# Patient Record
Sex: FEMALE | Race: WHITE | NOT HISPANIC OR LATINO
[De-identification: names, ages, dates, MRNs, and addresses within clinical notes are randomized per-mention and may not be internally consistent; named-entity substitution may affect disease eponyms.]

---

## 2017-11-01 ENCOUNTER — APPOINTMENT (OUTPATIENT)
Dept: ALLERGY | Facility: CLINIC | Age: 57
End: 2017-11-01
Payer: COMMERCIAL

## 2017-11-01 VITALS
HEIGHT: 65 IN | HEART RATE: 72 BPM | RESPIRATION RATE: 14 BRPM | SYSTOLIC BLOOD PRESSURE: 140 MMHG | WEIGHT: 184 LBS | BODY MASS INDEX: 30.66 KG/M2 | DIASTOLIC BLOOD PRESSURE: 88 MMHG

## 2017-11-01 PROCEDURE — G0008: CPT

## 2017-11-01 PROCEDURE — 94060 EVALUATION OF WHEEZING: CPT

## 2017-11-01 PROCEDURE — 99214 OFFICE O/P EST MOD 30 MIN: CPT | Mod: 25

## 2017-11-01 PROCEDURE — 90686 IIV4 VACC NO PRSV 0.5 ML IM: CPT

## 2017-11-01 RX ORDER — ALBUTEROL SULFATE 90 UG/1
108 (90 BASE) AEROSOL, METERED RESPIRATORY (INHALATION) EVERY 6 HOURS
Qty: 3 | Refills: 0 | Status: ACTIVE | COMMUNITY
Start: 2017-11-01 | End: 1900-01-01

## 2017-11-01 RX ORDER — FLUTICASONE PROPIONATE AND SALMETEROL 50; 250 UG/1; UG/1
250-50 POWDER RESPIRATORY (INHALATION)
Qty: 3 | Refills: 1 | Status: ACTIVE | COMMUNITY
Start: 2017-11-01 | End: 1900-01-01

## 2017-11-01 RX ORDER — PREDNISONE 10 MG/1
10 TABLET ORAL
Qty: 27 | Refills: 0 | Status: ACTIVE | COMMUNITY
Start: 2017-11-01 | End: 1900-01-01

## 2018-11-01 RX ORDER — PREDNISONE 10 MG/1
10 TABLET ORAL
Qty: 15 | Refills: 0 | Status: ACTIVE | COMMUNITY
Start: 2018-11-01 | End: 1900-01-01

## 2021-12-01 ENCOUNTER — APPOINTMENT (OUTPATIENT)
Dept: UROGYNECOLOGY | Facility: CLINIC | Age: 61
End: 2021-12-01
Payer: COMMERCIAL

## 2021-12-01 VITALS
WEIGHT: 199 LBS | HEART RATE: 71 BPM | SYSTOLIC BLOOD PRESSURE: 162 MMHG | DIASTOLIC BLOOD PRESSURE: 92 MMHG | HEIGHT: 65 IN | BODY MASS INDEX: 33.15 KG/M2 | TEMPERATURE: 97.3 F

## 2021-12-01 LAB
BILIRUB UR QL STRIP: NORMAL
CLARITY UR: CLEAR
COLLECTION METHOD: NORMAL
GLUCOSE UR-MCNC: NORMAL
HCG UR QL: 0.2 EU/DL
HGB UR QL STRIP.AUTO: NORMAL
KETONES UR-MCNC: NORMAL
LEUKOCYTE ESTERASE UR QL STRIP: NORMAL
NITRITE UR QL STRIP: NORMAL
PH UR STRIP: 5
PROT UR STRIP-MCNC: NORMAL
SP GR UR STRIP: 1.02

## 2021-12-01 PROCEDURE — 99204 OFFICE O/P NEW MOD 45 MIN: CPT | Mod: 25

## 2021-12-01 PROCEDURE — 51701 INSERT BLADDER CATHETER: CPT

## 2021-12-01 PROCEDURE — 81003 URINALYSIS AUTO W/O SCOPE: CPT | Mod: QW

## 2021-12-01 NOTE — PHYSICAL EXAM
[Chaperone Present] : A chaperone was present in the examining room during all aspects of the physical examination [No Acute Distress] : in no acute distress [Well developed] : well developed [Well Nourished] : ~L well nourished [Oriented x3] : oriented to person, place, and time [Respirations regular] : ~T respiratory rate was regular [No Edema] : ~T edema was not present [Warm and Dry] : was warm and dry to touch [Normal Gait] : gait was normal [Normal] : normal external genitalia [Vulvar Atrophy] : vulvar atrophy [Normal Appearance] : general appearance was normal [Atrophy] : atrophy [Rectocele] : a rectocele [Cystocele] : a cystocele [Aa ____] : Aa [unfilled] [Ba ____] : Ba [unfilled] [C ____] : C [unfilled] [GH ____] : GH [unfilled] [PB ____] : PB [unfilled] [TVL ____] : TVL  [unfilled] [Ap ____] : Ap [unfilled] [Bp ____] : Bp [unfilled] [Tenderness] : ~T no ~M abdominal tenderness observed [Distended] : not distended

## 2021-12-01 NOTE — DISCUSSION/SUMMARY
[FreeTextEntry1] : I reviewed the above findings with the patient.  We discussed the prolapse as well as her stress urinary incontinence.  Treatment options for the stress incontinence were discussed and included doing nothing, behavioral modification, Kegel's exercises with and without PT, medications, a pessary or intravaginal devices, periurethral bulking, and surgical correction with a suburethral sling v Rock v autologous sling. Treatment options for the prolapse were discussed and included doing nothing, Kegel exercises and behavioral modification, a pessary, or surgical correction.Surgically we discussed an anterior and/or posterior repair. \par We also discussed weight loss as a treatment option.\par She reports that she has tried Kegel's in the past without success and is interested in surgical correction.  I have asked her to return for urodynamic testing to further evaluate her urinary complaints and we will further discuss treatment options after her evaluation is completed.  I UG a patient information on pelvic organ prolapse, and stress incontinence was given to her.

## 2021-12-01 NOTE — HISTORY OF PRESENT ILLNESS
[Vaginal Wall Prolapse] : no [Rectal Prolapse] : no [Unable To Restrain Bowel Movement] : no [Urinary Frequency] : no [Urinary Tract Infection] : no [x1] : nocturia once nightly [de-identified] : occasionally with lifting & exercise,  [de-identified] : slightly increased

## 2022-01-06 ENCOUNTER — APPOINTMENT (OUTPATIENT)
Dept: UROGYNECOLOGY | Facility: CLINIC | Age: 62
End: 2022-01-06
Payer: COMMERCIAL

## 2022-01-06 ENCOUNTER — OUTPATIENT (OUTPATIENT)
Dept: OUTPATIENT SERVICES | Facility: HOSPITAL | Age: 62
LOS: 1 days | End: 2022-01-06
Payer: COMMERCIAL

## 2022-01-06 DIAGNOSIS — Z01.818 ENCOUNTER FOR OTHER PREPROCEDURAL EXAMINATION: ICD-10-CM

## 2022-01-06 PROCEDURE — 51797 INTRAABDOMINAL PRESSURE TEST: CPT

## 2022-01-06 PROCEDURE — 51784 ANAL/URINARY MUSCLE STUDY: CPT | Mod: 26

## 2022-01-06 PROCEDURE — 51784 ANAL/URINARY MUSCLE STUDY: CPT

## 2022-01-06 PROCEDURE — 51729 CYSTOMETROGRAM W/VP&UP: CPT | Mod: 26

## 2022-01-06 PROCEDURE — 51797 INTRAABDOMINAL PRESSURE TEST: CPT | Mod: 26

## 2022-01-06 PROCEDURE — 51729 CYSTOMETROGRAM W/VP&UP: CPT

## 2022-02-18 ENCOUNTER — APPOINTMENT (OUTPATIENT)
Dept: UROGYNECOLOGY | Facility: CLINIC | Age: 62
End: 2022-02-18
Payer: COMMERCIAL

## 2022-02-18 PROCEDURE — 99214 OFFICE O/P EST MOD 30 MIN: CPT | Mod: 95

## 2022-02-18 NOTE — HISTORY OF PRESENT ILLNESS
[Home] : at home, [unfilled] , at the time of the visit. [Medical Office: ([facility])___] : at the medical office located in  [Verbal consent obtained from patient] : the patient, [unfilled] [FreeTextEntry1] : Patient for follow-up on her pelvic organ prolapse and stress urinary incontinence.  Her chart was reviewed.  On examination in December she had urethral hypermobility/cystocele and a rectocele.  She underwent urodynamic testing which revealed stress urinary incontinence.  I reviewed these findings with her.\par  Treatment options for the stress incontinence were discussed and included doing nothing, behavioral modification, Kegel's exercises with and without PT, medications, a pessary or intravaginal devices, periurethral bulking, and surgical correction with a suburethral sling v Rock v autologous sling.\par Treatment options for the prolapse were discussed and included doing nothing, Kegel exercises and behavioral modification, a pessary, or surgical correction.Surgically we discussed a posterior repair with possible anterior repair.  She has tried Kegel exercises in the past and is interested in surgical correction.  She would like to proceed with a posterior repair, mid urethral sling, cystoscopy and possible anterior repair.  We discussed the possibility of going home with a catheter and ambulatory nature of the surgery.  All questions were answered and she wishes to schedule surgery.  I have asked I UG a patient information on mid urethral sling, anterior repair, and posterior repair to be mailed to the patient.

## 2022-05-03 ENCOUNTER — APPOINTMENT (OUTPATIENT)
Dept: ALLERGY | Facility: CLINIC | Age: 62
End: 2022-05-03

## 2022-06-15 ENCOUNTER — APPOINTMENT (OUTPATIENT)
Dept: UROGYNECOLOGY | Facility: CLINIC | Age: 62
End: 2022-06-15

## 2022-06-20 ENCOUNTER — APPOINTMENT (OUTPATIENT)
Dept: UROGYNECOLOGY | Facility: CLINIC | Age: 62
End: 2022-06-20

## 2022-07-21 ENCOUNTER — APPOINTMENT (OUTPATIENT)
Dept: UROGYNECOLOGY | Facility: HOSPITAL | Age: 62
End: 2022-07-21

## 2023-03-01 ENCOUNTER — APPOINTMENT (OUTPATIENT)
Dept: UROGYNECOLOGY | Facility: CLINIC | Age: 63
End: 2023-03-01
Payer: COMMERCIAL

## 2023-03-01 VITALS
HEIGHT: 65 IN | SYSTOLIC BLOOD PRESSURE: 140 MMHG | BODY MASS INDEX: 32.99 KG/M2 | WEIGHT: 198 LBS | DIASTOLIC BLOOD PRESSURE: 88 MMHG

## 2023-03-01 DIAGNOSIS — N36.41 HYPERMOBILITY OF URETHRA: ICD-10-CM

## 2023-03-01 PROCEDURE — 99215 OFFICE O/P EST HI 40 MIN: CPT

## 2023-03-01 NOTE — HISTORY OF PRESENT ILLNESS
[Urinary Frequency] : no [x1] : nocturia once nightly [FreeTextEntry5] : sometimes  [de-identified] : with exercise, sneezing, and cough [de-identified] : rare [de-identified] : 1-2 [de-identified] : + sexually active [FreeTextEntry1] : Patient here today for follow-up on her urinary incontinence and rectocele.  She was scheduled for surgery in 2022 but then had personal issues and had to cancel it.  Her chart was reviewed.  Patient was last seen in February 2022. She underwent urodynamic testing in January 2022 which revealed stress urinary incontinence\par

## 2023-03-01 NOTE — DISCUSSION/SUMMARY
[FreeTextEntry1] : Reviewed the above findings with the patient treatment options for the stress incontinence were discussed and included doing nothing, behavioral modification, Kegel's exercises with and without PT, medications, a pessary or intravaginal devices, periurethral bulking, and surgical correction with a suburethral sling v Rock v autologous sling.\par We also discussed surgical and nonsurgical treatment options for the rectocele.  She is interested in surgical correction and wishes to proceed with a mid urethral sling for the stress incontinence and posterior repair for the rectocele.  We discussed the possibility of an anterior repair at the time of surgical correction.  We discussed the possibility of going home with a catheter. [facility] patient information on mid urethral sling, posterior repair, and anterior pair with possible written on top was given to her.

## 2023-03-01 NOTE — PHYSICAL EXAM
[Chaperone Present] : A chaperone was present in the examining room during all aspects of the physical examination [No Acute Distress] : in no acute distress [Well developed] : well developed [Well Nourished] : ~L well nourished [Normal Mood/Affect] : mood and affect are normal [Vulvar Atrophy] : vulvar atrophy [Normal Appearance] : general appearance was normal [Rectocele] : a rectocele [Aa ____] : Aa [unfilled] [Ba ____] : Ba [unfilled] [C ____] : C [unfilled] [GH ____] : GH [unfilled] [PB ____] : PB [unfilled] [TVL ____] : TVL  [unfilled] [Ap ____] : Ap [unfilled] [Bp ____] : Bp [unfilled] [D ____] : D [unfilled] [Normal] : normal [Uterine Adnexae] : were not tender and not enlarged [Anxiety] : patient is not anxious [Tenderness] : ~T no ~M abdominal tenderness observed [Distended] : not distended [FreeTextEntry3] : Positive hypermobility

## 2023-04-28 ENCOUNTER — OUTPATIENT (OUTPATIENT)
Dept: OUTPATIENT SERVICES | Facility: HOSPITAL | Age: 63
LOS: 1 days | End: 2023-04-28
Payer: COMMERCIAL

## 2023-04-28 VITALS
SYSTOLIC BLOOD PRESSURE: 149 MMHG | RESPIRATION RATE: 18 BRPM | HEIGHT: 65 IN | WEIGHT: 205.03 LBS | TEMPERATURE: 99 F | OXYGEN SATURATION: 97 % | DIASTOLIC BLOOD PRESSURE: 84 MMHG | HEART RATE: 68 BPM

## 2023-04-28 DIAGNOSIS — Z98.890 OTHER SPECIFIED POSTPROCEDURAL STATES: Chronic | ICD-10-CM

## 2023-04-28 DIAGNOSIS — N81.6 RECTOCELE: ICD-10-CM

## 2023-04-28 DIAGNOSIS — N39.3 STRESS INCONTINENCE (FEMALE) (MALE): ICD-10-CM

## 2023-04-28 DIAGNOSIS — N81.11 CYSTOCELE, MIDLINE: ICD-10-CM

## 2023-04-28 DIAGNOSIS — Z01.818 ENCOUNTER FOR OTHER PREPROCEDURAL EXAMINATION: ICD-10-CM

## 2023-04-28 PROCEDURE — 86850 RBC ANTIBODY SCREEN: CPT

## 2023-04-28 PROCEDURE — 85027 COMPLETE CBC AUTOMATED: CPT

## 2023-04-28 PROCEDURE — 86901 BLOOD TYPING SEROLOGIC RH(D): CPT

## 2023-04-28 PROCEDURE — 36415 COLL VENOUS BLD VENIPUNCTURE: CPT

## 2023-04-28 PROCEDURE — 86900 BLOOD TYPING SEROLOGIC ABO: CPT

## 2023-04-28 PROCEDURE — 80048 BASIC METABOLIC PNL TOTAL CA: CPT

## 2023-04-28 PROCEDURE — G0463: CPT

## 2023-04-28 RX ORDER — LIDOCAINE HCL 20 MG/ML
0.2 VIAL (ML) INJECTION ONCE
Refills: 0 | Status: DISCONTINUED | OUTPATIENT
Start: 2023-05-18 | End: 2023-06-02

## 2023-04-28 RX ORDER — CEFAZOLIN SODIUM 1 G
2000 VIAL (EA) INJECTION ONCE
Refills: 0 | Status: COMPLETED | OUTPATIENT
Start: 2023-05-18 | End: 2023-05-18

## 2023-04-28 RX ORDER — SODIUM CHLORIDE 9 MG/ML
1000 INJECTION, SOLUTION INTRAVENOUS
Refills: 0 | Status: DISCONTINUED | OUTPATIENT
Start: 2023-05-18 | End: 2023-06-02

## 2023-04-28 NOTE — H&P PST ADULT - NSICDXPASTSURGICALHX_GEN_ALL_CORE_FT
PAST SURGICAL HISTORY:  H/O bilateral breast reduction surgery     History of breast biopsy     History of cystostomy     History of endometrial ablation     History of transurethral resection of bladder tumor (TURBT)

## 2023-04-28 NOTE — H&P PST ADULT - NSANTHOSAYNRD_GEN_A_CORE
No. FARNAZ screening performed.  STOP BANG Legend: 0-2 = LOW Risk; 3-4 = INTERMEDIATE Risk; 5-8 = HIGH Risk

## 2023-04-28 NOTE — H&P PST ADULT - HISTORY OF PRESENT ILLNESS
62yr old female with hx of exercise induced stress incontinence. Now coming in for posterior repair midurethral sling cystoscopy possible anterior repair. Hx sig for bladder Ca asthma in control double kidney, BMI 34.1 Hx od covid 10/2022 congestion  wheezing took Paxlovid.

## 2023-05-03 ENCOUNTER — APPOINTMENT (OUTPATIENT)
Dept: UROGYNECOLOGY | Facility: CLINIC | Age: 63
End: 2023-05-03
Payer: COMMERCIAL

## 2023-05-03 VITALS — HEIGHT: 65 IN | DIASTOLIC BLOOD PRESSURE: 86 MMHG | HEART RATE: 82 BPM | SYSTOLIC BLOOD PRESSURE: 154 MMHG

## 2023-05-03 DIAGNOSIS — R35.0 FREQUENCY OF MICTURITION: ICD-10-CM

## 2023-05-03 PROCEDURE — 99214 OFFICE O/P EST MOD 30 MIN: CPT | Mod: 25

## 2023-05-03 PROCEDURE — 51701 INSERT BLADDER CATHETER: CPT

## 2023-05-03 NOTE — DISCUSSION/SUMMARY
[FreeTextEntry1] : \daniel Kerr is a 61 yo who presents for follow up today on prolapse and urinary incontinence. We discussed her prolapse and observation, nonsurgical and surgical options and she elects to proceed with surgery for this with posterior repair, possible anterior repair. We discussed options for her stress urinary incontinence including expectant management, surgical options and nonsurgical options. She continues to desire surgical management. We discussed surgical management options including a midurethral sling, swenson colposuspension and pubovaginal sling using native tissue. She desires a midurethral sling with mesh. We discussed options of TOT, retropubic or mini sling and she opts for mini-midurethral sling. \par \par We reviewed risks to the procedure including, but not limited to: bleeding, infection, pain, urinary retention requiring an indwelling catheter,  recurrence of stress incontinence or prolapse, failure of procedure to alleviate symptoms,worsening of overactive bladder or urge incontinence, voiding dysfunction, dyspareunia. We discussed that this surgery is not intended to fix her urge incontinence, urinary frequency or urgency and that this could worsen/stay the same after surgery.  We also extensively reviewed the risk of injury to the bladder, ureters, urethra, vagina, and rectum. We also discussed the risk of sling mesh exposure, fistula formation, neuropathy, erosion, pain, and need for reoperation. Risks were explained in layman's terms. She expressed understanding of these risks and continues to desire the planned surgical procedure. We reviewed her hospital stay as well as preoperative and postoperative instructions. \par \par Pt signed consent for: pelvic exam under anesthesia, posterior repair, mini-midurethral sling, cystoscopy, possible anterior repair. Pt understands that pelvic exam under anesthesia can be performed by learners (medical students/residents/fellows).\par \par \par

## 2023-05-03 NOTE — PHYSICAL EXAM
[Chaperone Present] : A chaperone was present in the examining room during all aspects of the physical examination [No Acute Distress] : in no acute distress [Well developed] : well developed [Well Nourished] : ~L well nourished [Oriented x3] : oriented to person, place, and time [Normal Memory] : ~T memory was ~L unimpaired [Respirations regular] : ~T respiratory rate was regular [Warm and Dry] : was warm and dry to touch [Normal Gait] : gait was normal [Labia Majora] : were normal [Labia Minora] : were normal [Normal Appearance] : general appearance was normal [Atrophy] : atrophy [Aa ____] : Aa [unfilled] [Ba ____] : Ba [unfilled] [C ____] : C [unfilled] [GH ____] : GH [unfilled] [PB ____] : PB [unfilled] [TVL ____] : TVL  [unfilled] [Ap ____] : Ap [unfilled] [Bp ____] : Bp [unfilled] [D ____] : D [unfilled] [Exam Deferred] : was deferred [Normal] : was normal [Tenderness] : ~T no ~M abdominal tenderness observed [Distended] : not distended

## 2023-05-03 NOTE — HISTORY OF PRESENT ILLNESS
[FreeTextEntry1] : \par Usha is a 63 yo who presents today for follow up on prolapse and urinary incontinence. She was scheduled for surgery in 2022, but had to cancel bc of personal issues. \par \par UDS: +-500mL. No DO. PVR 10mL. \par \par PSHx: breast reduction\par PMHx: asthma, hx of bladder cancer s/p resection of ca and BCG instillations

## 2023-05-05 LAB — BACTERIA UR CULT: NORMAL

## 2023-05-17 ENCOUNTER — TRANSCRIPTION ENCOUNTER (OUTPATIENT)
Age: 63
End: 2023-05-17

## 2023-05-18 ENCOUNTER — APPOINTMENT (OUTPATIENT)
Dept: UROGYNECOLOGY | Facility: HOSPITAL | Age: 63
End: 2023-05-18
Payer: COMMERCIAL

## 2023-05-18 ENCOUNTER — OUTPATIENT (OUTPATIENT)
Dept: OUTPATIENT SERVICES | Facility: HOSPITAL | Age: 63
LOS: 1 days | End: 2023-05-18
Payer: COMMERCIAL

## 2023-05-18 ENCOUNTER — TRANSCRIPTION ENCOUNTER (OUTPATIENT)
Age: 63
End: 2023-05-18

## 2023-05-18 VITALS
OXYGEN SATURATION: 99 % | DIASTOLIC BLOOD PRESSURE: 78 MMHG | TEMPERATURE: 98 F | HEART RATE: 72 BPM | RESPIRATION RATE: 18 BRPM | SYSTOLIC BLOOD PRESSURE: 157 MMHG

## 2023-05-18 VITALS
DIASTOLIC BLOOD PRESSURE: 79 MMHG | SYSTOLIC BLOOD PRESSURE: 144 MMHG | OXYGEN SATURATION: 96 % | WEIGHT: 205.03 LBS | HEART RATE: 82 BPM | TEMPERATURE: 98 F | HEIGHT: 65 IN | RESPIRATION RATE: 16 BRPM

## 2023-05-18 DIAGNOSIS — Z98.890 OTHER SPECIFIED POSTPROCEDURAL STATES: Chronic | ICD-10-CM

## 2023-05-18 DIAGNOSIS — N39.3 STRESS INCONTINENCE (FEMALE) (MALE): ICD-10-CM

## 2023-05-18 DIAGNOSIS — N81.6 RECTOCELE: ICD-10-CM

## 2023-05-18 DIAGNOSIS — N81.11 CYSTOCELE, MIDLINE: ICD-10-CM

## 2023-05-18 PROCEDURE — 57288 REPAIR BLADDER DEFECT: CPT

## 2023-05-18 PROCEDURE — 57250 REPAIR RECTUM & VAGINA: CPT

## 2023-05-18 PROCEDURE — C9399: CPT

## 2023-05-18 PROCEDURE — C1771: CPT

## 2023-05-18 DEVICE — ALTIS SINGLE INCISION SLING SYSTEM 7.75CM: Type: IMPLANTABLE DEVICE | Status: FUNCTIONAL

## 2023-05-18 RX ORDER — ONDANSETRON 8 MG/1
4 TABLET, FILM COATED ORAL ONCE
Refills: 0 | Status: DISCONTINUED | OUTPATIENT
Start: 2023-05-18 | End: 2023-06-02

## 2023-05-18 RX ORDER — OXYCODONE HYDROCHLORIDE 5 MG/1
1 TABLET ORAL
Qty: 5 | Refills: 0
Start: 2023-05-18

## 2023-05-18 RX ORDER — ACETAMINOPHEN 500 MG
3 TABLET ORAL
Qty: 0 | Refills: 0 | DISCHARGE

## 2023-05-18 RX ORDER — SODIUM CHLORIDE 9 MG/ML
1000 INJECTION, SOLUTION INTRAVENOUS
Refills: 0 | Status: DISCONTINUED | OUTPATIENT
Start: 2023-05-18 | End: 2023-06-02

## 2023-05-18 RX ORDER — IBUPROFEN 200 MG
1 TABLET ORAL
Qty: 0 | Refills: 0 | DISCHARGE

## 2023-05-18 RX ORDER — HYDROMORPHONE HYDROCHLORIDE 2 MG/ML
0.25 INJECTION INTRAMUSCULAR; INTRAVENOUS; SUBCUTANEOUS
Refills: 0 | Status: DISCONTINUED | OUTPATIENT
Start: 2023-05-18 | End: 2023-05-18

## 2023-05-18 RX ORDER — OXYCODONE HYDROCHLORIDE 5 MG/1
5 TABLET ORAL ONCE
Refills: 0 | Status: DISCONTINUED | OUTPATIENT
Start: 2023-05-18 | End: 2023-05-18

## 2023-05-18 RX ORDER — APREPITANT 80 MG/1
40 CAPSULE ORAL ONCE
Refills: 0 | Status: COMPLETED | OUTPATIENT
Start: 2023-05-18 | End: 2023-05-18

## 2023-05-18 RX ADMIN — SODIUM CHLORIDE 100 MILLILITER(S): 9 INJECTION, SOLUTION INTRAVENOUS at 13:44

## 2023-05-18 RX ADMIN — APREPITANT 40 MILLIGRAM(S): 80 CAPSULE ORAL at 13:44

## 2023-05-18 NOTE — BRIEF OPERATIVE NOTE - NSICDXBRIEFPOSTOP_GEN_ALL_CORE_FT
POST-OP DIAGNOSIS:  Rectocele 18-May-2023 16:18:19  Jere Rodriguez  Urinary, incontinence, stress female 18-May-2023 16:18:26  Jere Rodriguez

## 2023-05-18 NOTE — ASU DISCHARGE PLAN (ADULT/PEDIATRIC) - CARE PROVIDER_API CALL
Tashi Rea; NIKIA)  Female Pelvic MedReconst Surg; Obstetrics and Gynecology  [address]  [place] [address]  Phone: [phone]  Fax: [phone]  Follow Up Time: 2 weeks

## 2023-05-18 NOTE — ASU DISCHARGE PLAN (ADULT/PEDIATRIC) - NURSING INSTRUCTIONS
You can take Tylenol (Acetaminophen) every 6 hours and Motrin (Ibuprofen) very 6 hours, as needed. You may alternate these medications such that you take one or the other every 3 hours for around the clock pain coverage. Do not exceed 4000mg of Tylenol (Acetaminophen) daily. You received Tylenol in the OR at 3PM. The next time you can take Tylenol is at 9PM. You can take Ibuprofen at whatever time when you get home, if needed.

## 2023-05-18 NOTE — BRIEF OPERATIVE NOTE - OPERATION/FINDINGS
Rectocele noted; repaired. Cystoscopy with nl bilateral ureteral orifices, no tumor, suture, foreign body or injury noted within the bladder.

## 2023-05-18 NOTE — BRIEF OPERATIVE NOTE - NSICDXBRIEFPROCEDURE_GEN_ALL_CORE_FT
PROCEDURES:  Posterior vaginal repair 18-May-2023 16:17:52  Jere Rodriguez  Creation of midurethral sling with cystoscopy 18-May-2023 16:17:56  Jere Rodriguez

## 2023-05-18 NOTE — BRIEF OPERATIVE NOTE - NSICDXBRIEFPREOP_GEN_ALL_CORE_FT
PRE-OP DIAGNOSIS:  Rectocele 18-May-2023 16:18:04  Jere Rodriguez  Urinary, incontinence, stress female 18-May-2023 16:18:12  Jere Rodriguez

## 2023-05-18 NOTE — CHART NOTE - NSCHARTNOTEFT_GEN_A_CORE
TOV Note    Active Trial of Void performed.   250cc NS instilled into the bladder by gravity. Vaginal packing removed.  Mendieta D/C'd  Pt voided  240  cc   Mendieta catheter to remain out. Patient discharged home.     d/w Dr. Rodriguez, PGY7    Laura oWodson, PGY-1

## 2023-05-18 NOTE — ASU DISCHARGE PLAN (ADULT/PEDIATRIC) - NS MD DC FALL RISK RISK
For information on Fall & Injury Prevention, visit: [email or web address] OR  [email or web address] OR  https://www.cdc.gov/steadi/patient.html

## 2023-05-18 NOTE — ASU PATIENT PROFILE, ADULT - NS TRANSFER EYEGLASSES PAIRS
Impression: Primary open-angle glaucoma, mild stage, bilateral
*all related to IDDM complications. *IOPs today 18mmHg OD and 16mmHg OS with Dorzolamide TID OU and Latanoprost QHS OU. Patient reports using gtt faithfully. *HVF 24-2 (01/29/2021) OD: enlarged BS with binasal step OS: centrocecal defects with moderate inferior arcuate defects *HVF 24-2 (02/20/2020) OD: full OS: central cecal defects stable *HVF 24-2 (12/21/18) OD: superior nasal step OS: inferior arcuate and few central four depression points noted *OCT RNFL (01/20/20) OD: 85um; inferior thinning OS: 83um ; superior/ temporal thinning *OCT RNFL (01/24/19) OD: 84um ; inferior thinning OS: 98um ; superior thinning Plan: Patient to continue with Latanoprost QHS OU and Dorzolamide BID OU. Refilled medications to [address] [facility] in 4 months for IOP check with Dr. John Fuel 1 pair

## 2023-05-18 NOTE — ASU PATIENT PROFILE, ADULT - FALL HARM RISK - UNIVERSAL INTERVENTIONS
Bed in lowest position, wheels locked, appropriate side rails in place/Call bell, personal items and telephone in reach/Instruct patient to call for assistance before getting out of bed or chair/Non-slip footwear when patient is out of bed/[place] to call system/Physically safe environment - no spills, clutter or unnecessary equipment/Purposeful Proactive Rounding/Room/bathroom lighting operational, light cord in reach

## 2023-05-23 ENCOUNTER — APPOINTMENT (OUTPATIENT)
Dept: UROGYNECOLOGY | Facility: CLINIC | Age: 63
End: 2023-05-23
Payer: COMMERCIAL

## 2023-05-23 ENCOUNTER — NON-APPOINTMENT (OUTPATIENT)
Age: 63
End: 2023-05-23

## 2023-05-23 VITALS — SYSTOLIC BLOOD PRESSURE: 160 MMHG | DIASTOLIC BLOOD PRESSURE: 86 MMHG | HEART RATE: 67 BPM

## 2023-05-23 PROBLEM — E66.9 OBESITY, UNSPECIFIED: Chronic | Status: ACTIVE | Noted: 2023-04-28

## 2023-05-23 PROBLEM — Q63.8 OTHER SPECIFIED CONGENITAL MALFORMATIONS OF KIDNEY: Chronic | Status: ACTIVE | Noted: 2023-04-28

## 2023-05-23 PROCEDURE — 99024 POSTOP FOLLOW-UP VISIT: CPT

## 2023-05-26 ENCOUNTER — APPOINTMENT (OUTPATIENT)
Dept: UROGYNECOLOGY | Facility: CLINIC | Age: 63
End: 2023-05-26
Payer: COMMERCIAL

## 2023-05-26 DIAGNOSIS — N39.3 STRESS INCONTINENCE (FEMALE) (MALE): ICD-10-CM

## 2023-05-26 DIAGNOSIS — N81.11 CYSTOCELE, MIDLINE: ICD-10-CM

## 2023-05-26 DIAGNOSIS — N81.6 RECTOCELE: ICD-10-CM

## 2023-05-26 PROCEDURE — 99024 POSTOP FOLLOW-UP VISIT: CPT

## 2023-06-07 ENCOUNTER — APPOINTMENT (OUTPATIENT)
Dept: UROGYNECOLOGY | Facility: CLINIC | Age: 63
End: 2023-06-07
Payer: COMMERCIAL

## 2023-06-07 VITALS
HEIGHT: 65 IN | SYSTOLIC BLOOD PRESSURE: 145 MMHG | BODY MASS INDEX: 32.32 KG/M2 | DIASTOLIC BLOOD PRESSURE: 80 MMHG | WEIGHT: 194 LBS

## 2023-06-07 PROCEDURE — 99024 POSTOP FOLLOW-UP VISIT: CPT

## 2023-06-07 NOTE — SUBJECTIVE
[FreeTextEntry1] : Feels generally well. [FreeTextEntry7] : Denies pain. [FreeTextEntry6] : Eating well. [FreeTextEntry5] : Denies ION.  Denies urinary urgency, frequency, or dysuria. [FreeTextEntry4] : Having daily BMs without issue.  Taking miralax. [FreeTextEntry3] : Ambulating daily without issue. [FreeTextEntry9] : Occasionally light spotting.  Also has some vaginal irritation where stitches are.

## 2023-06-07 NOTE — OBJECTIVE
[Soft and Nontender] : soft and nontender [Clean, Dry, Intact] : Clean, Dry, Intact [Good Support] : Good support [Healing well] : healing well [No Masses or Tenderness] : no masses or tenderness [FreeTextEntry3] : No mesh tenderness or exposure.

## 2023-06-07 NOTE — DISCUSSION/SUMMARY
[Post-Op instructions given. Pt/family verbalizes understanding] : post-operative instructions were provided to the patient/family who verbalize understanding [FreeTextEntry1] : Usha is a 62F s/p posterior repair, MUS, cystoscopy on 5/18/2023, doing well.  Postoperative restrictions, instructions, and bleeding precautions reviewed.  She was counseled to call if any questions and RTO in 4 weeks or sooner, should issues arise. Patient verbalized understanding.  All questions answered.\par

## 2023-06-23 ENCOUNTER — NON-APPOINTMENT (OUTPATIENT)
Age: 63
End: 2023-06-23

## 2023-07-03 ENCOUNTER — APPOINTMENT (OUTPATIENT)
Dept: UROGYNECOLOGY | Facility: CLINIC | Age: 63
End: 2023-07-03
Payer: COMMERCIAL

## 2023-07-03 VITALS
DIASTOLIC BLOOD PRESSURE: 87 MMHG | BODY MASS INDEX: 32.32 KG/M2 | HEIGHT: 65 IN | WEIGHT: 194 LBS | SYSTOLIC BLOOD PRESSURE: 157 MMHG

## 2023-07-03 DIAGNOSIS — Z09 ENCOUNTER FOR FOLLOW-UP EXAMINATION AFTER COMPLETED TREATMENT FOR CONDITIONS OTHER THAN MALIGNANT NEOPLASM: ICD-10-CM

## 2023-07-03 PROCEDURE — 99024 POSTOP FOLLOW-UP VISIT: CPT

## 2023-07-03 NOTE — SUBJECTIVE
[FreeTextEntry1] : Feels generally well. [FreeTextEntry7] : Denies any pain. [FreeTextEntry6] : Eating well. [FreeTextEntry5] : Denies ION.  Denies urinary urgency, frequency, or dysuria. [FreeTextEntry4] : Having daily BMs without issue. [FreeTextEntry3] : Ambulating daily without issue. [FreeTextEntry9] : Denies any vaginal bleeding.

## 2023-07-03 NOTE — OBJECTIVE
[Soft and Nontender] : soft and nontender [Clean, Dry, Intact] : Clean, Dry, Intact [Good Support] : Good support [Healing well] : healing well [No Masses or Tenderness] : no masses or tenderness [FreeTextEntry3] : No mesh tenderness or exposure. No vaginal bleeding.

## 2023-07-03 NOTE — DISCUSSION/SUMMARY
[Post-Op instructions given. Pt/family verbalizes understanding] : post-operative instructions were provided to the patient/family who verbalize understanding [FreeTextEntry1] : Usha is a 62F s/p posterior repair, MUS, cystoscopy (5/18/2023), doing well.  Discussed that patient no longer has any postoperative restrictions. She may follow up as needed. Patient verbalized understanding.  All questions answered.\par
